# Patient Record
Sex: MALE | Race: WHITE | Employment: OTHER | URBAN - METROPOLITAN AREA
[De-identification: names, ages, dates, MRNs, and addresses within clinical notes are randomized per-mention and may not be internally consistent; named-entity substitution may affect disease eponyms.]

---

## 2021-10-18 ENCOUNTER — HOSPITAL ENCOUNTER (OUTPATIENT)
Age: 76
Setting detail: SPECIMEN
Discharge: HOME | End: 2021-10-18
Payer: MEDICARE

## 2021-10-18 ENCOUNTER — OFFICE VISIT (OUTPATIENT)
Dept: ORTHOPEDIC SURGERY | Age: 76
End: 2021-10-18
Payer: MEDICARE

## 2021-10-18 VITALS — BODY MASS INDEX: 35.36 KG/M2 | HEIGHT: 66 IN | WEIGHT: 220 LBS

## 2021-10-18 DIAGNOSIS — M25.561 RIGHT KNEE PAIN, UNSPECIFIED CHRONICITY: Primary | ICD-10-CM

## 2021-10-18 DIAGNOSIS — M1A.0620 CHRONIC GOUT OF LEFT KNEE, UNSPECIFIED CAUSE: ICD-10-CM

## 2021-10-18 DIAGNOSIS — M17.11 PRIMARY OSTEOARTHRITIS OF RIGHT KNEE: Primary | ICD-10-CM

## 2021-10-18 LAB — URIC ACID: 9.9 MG/DL (ref 3.4–7)

## 2021-10-18 PROCEDURE — G8484 FLU IMMUNIZE NO ADMIN: HCPCS | Performed by: PHYSICIAN ASSISTANT

## 2021-10-18 PROCEDURE — 20611 DRAIN/INJ JOINT/BURSA W/US: CPT | Performed by: PHYSICIAN ASSISTANT

## 2021-10-18 PROCEDURE — G8417 CALC BMI ABV UP PARAM F/U: HCPCS | Performed by: PHYSICIAN ASSISTANT

## 2021-10-18 PROCEDURE — 1123F ACP DISCUSS/DSCN MKR DOCD: CPT | Performed by: PHYSICIAN ASSISTANT

## 2021-10-18 PROCEDURE — 4040F PNEUMOC VAC/ADMIN/RCVD: CPT | Performed by: PHYSICIAN ASSISTANT

## 2021-10-18 PROCEDURE — G8427 DOCREV CUR MEDS BY ELIG CLIN: HCPCS | Performed by: PHYSICIAN ASSISTANT

## 2021-10-18 PROCEDURE — 4004F PT TOBACCO SCREEN RCVD TLK: CPT | Performed by: PHYSICIAN ASSISTANT

## 2021-10-18 PROCEDURE — 3017F COLORECTAL CA SCREEN DOC REV: CPT | Performed by: PHYSICIAN ASSISTANT

## 2021-10-18 PROCEDURE — 99203 OFFICE O/P NEW LOW 30 MIN: CPT | Performed by: PHYSICIAN ASSISTANT

## 2021-10-18 RX ORDER — METHYLPREDNISOLONE ACETATE 80 MG/ML
80 INJECTION, SUSPENSION INTRA-ARTICULAR; INTRALESIONAL; INTRAMUSCULAR; SOFT TISSUE ONCE
Status: COMPLETED | OUTPATIENT
Start: 2021-10-18 | End: 2021-10-18

## 2021-10-18 RX ORDER — COLCHICINE 0.6 MG/1
TABLET ORAL
COMMUNITY

## 2021-10-18 RX ORDER — ASPIRIN 81 MG/1
81 TABLET ORAL DAILY
COMMUNITY

## 2021-10-18 RX ORDER — METOPROLOL TARTRATE 50 MG/1
TABLET, FILM COATED ORAL
COMMUNITY
Start: 2021-10-13

## 2021-10-18 RX ORDER — LISINOPRIL 40 MG/1
TABLET ORAL
COMMUNITY
Start: 2021-09-27

## 2021-10-18 RX ORDER — NITROGLYCERIN 0.4 MG/1
TABLET SUBLINGUAL
COMMUNITY
Start: 2021-09-22

## 2021-10-18 RX ORDER — ISOSORBIDE MONONITRATE 120 MG/1
120 TABLET, EXTENDED RELEASE ORAL DAILY
COMMUNITY
Start: 2021-09-27

## 2021-10-18 RX ORDER — ALLOPURINOL 300 MG/1
300 TABLET ORAL DAILY
COMMUNITY

## 2021-10-18 RX ORDER — PRASUGREL 10 MG/1
TABLET, FILM COATED ORAL
COMMUNITY
Start: 2020-12-10

## 2021-10-18 RX ORDER — PRAVASTATIN SODIUM 40 MG
TABLET ORAL
COMMUNITY
Start: 2021-09-09

## 2021-10-18 RX ORDER — LIDOCAINE HYDROCHLORIDE 10 MG/ML
4 INJECTION, SOLUTION INFILTRATION; PERINEURAL ONCE
Status: COMPLETED | OUTPATIENT
Start: 2021-10-18 | End: 2021-10-18

## 2021-10-18 RX ORDER — GLIPIZIDE 10 MG/1
TABLET ORAL
COMMUNITY
Start: 2021-08-10

## 2021-10-18 RX ORDER — AMLODIPINE BESYLATE 10 MG/1
TABLET ORAL
COMMUNITY
Start: 2021-09-08

## 2021-10-18 RX ORDER — PREGABALIN 75 MG/1
CAPSULE ORAL
COMMUNITY
Start: 2021-10-13

## 2021-10-18 RX ADMIN — LIDOCAINE HYDROCHLORIDE 4 ML: 10 INJECTION, SOLUTION INFILTRATION; PERINEURAL at 13:01

## 2021-10-18 RX ADMIN — METHYLPREDNISOLONE ACETATE 80 MG: 80 INJECTION, SUSPENSION INTRA-ARTICULAR; INTRALESIONAL; INTRAMUSCULAR; SOFT TISSUE at 13:01

## 2021-10-18 ASSESSMENT — ENCOUNTER SYMPTOMS
SHORTNESS OF BREATH: 0
VOMITING: 0
CHEST TIGHTNESS: 0
COLOR CHANGE: 0
NAUSEA: 0
APNEA: 0
CONSTIPATION: 0
RESPIRATORY NEGATIVE: 1
COUGH: 0
DIARRHEA: 0
ABDOMINAL DISTENTION: 0
ABDOMINAL PAIN: 0

## 2021-10-18 NOTE — PROGRESS NOTES
[facility]  [address] [address]  Dept: [phone]  Dept Fax: [phone]          Right Knee -previous Dr. Orlin Brito patient at the [facility]    Subjective:     Chief Complaint   Patient presents with    Knee Pain     Right      HPI:     Mickie Melgar presents today for Right knee pain. The pain has been present for 6 days. The patient recalls no specific injury. He does have a history of gout that he is treated with Colcrys and allopurinol. The patient has tried Tylenol with mild improvement. The pain is now described as Jacquelin Trimble and Dull . There is  pain on weight bearing. The knee has swelled. There is not painful popping and clicking. The knee has not caught or locked up. The knee has not given out. It is  stiff upon arising from sitting. It is  painful to go up and down stairs and sit for a prolonged time. The patient has had a cortisone injection on 1/13/2021 at another facility. The patient has not tried a lubrication injection. The patient has tried physical therapy. The patient has not had surgery. ROS:   Review of Systems   Constitutional: Positive for activity change. Negative for appetite change, fatigue and fever. Respiratory: Negative. Negative for apnea, cough, chest tightness and shortness of breath. Cardiovascular: Negative. Negative for chest pain, palpitations and leg swelling. Gastrointestinal: Negative for abdominal distention, abdominal pain, constipation, diarrhea, nausea and vomiting. Genitourinary: Negative for difficulty urinating, dysuria and hematuria. Musculoskeletal: Positive for arthralgias, gait problem and joint swelling. Negative for myalgias. Skin: Negative for color change and rash. Neurological: Negative for dizziness, weakness, numbness and headaches. Psychiatric/Behavioral: Positive for sleep disturbance.        Past Medical History:    Past Medical History:   Diagnosis Date    Arthritis     Diabetes mellitus ([facility] [address]     Gout     Hyperlipidemia     Hypertension        Past Surgical History:    No past surgical history on file. CurrentMedications:   Current Outpatient Medications   Medication Sig Dispense Refill    amLODIPine (NORVASC) 10 MG tablet       aspirin 81 MG EC tablet Take 81 mg by mouth daily      colchicine (COLCRYS) 0.6 MG tablet Colcrys 0.6 mg tablet      glipiZIDE (GLUCOTROL) 10 MG tablet       isosorbide mononitrate (IMDUR) 120 MG extended release tablet Take 120 mg by mouth daily      lisinopril (PRINIVIL;ZESTRIL) 40 MG tablet Take 2 tablets in the am and 1 in the pm      metoprolol tartrate (LOPRESSOR) 50 MG tablet       metFORMIN (GLUCOPHAGE) 500 MG tablet metformin 500 mg tablet      nitroGLYCERIN (NITROSTAT) 0.4 MG SL tablet nitroglycerin 0.4 mg sublingual tablet      pravastatin (PRAVACHOL) 40 MG tablet       prasugrel (EFFIENT) 10 MG TABS prasugrel 10 mg tablet      pregabalin (LYRICA) 75 MG capsule       Omega-3 Fat Ac-Cholecalciferol (CARDIO OMEGA BENEFITS/VIT D-3 PO) Take by mouth      allopurinol (ZYLOPRIM) 300 MG tablet Take 300 mg by mouth daily      Liraglutide (VICTOZA SC) Inject into the skin       No current facility-administered medications for this visit.        Allergies:    Atorvastatin    Social History:   Social History     Socioeconomic History    Marital status:      Spouse name: None    Number of children: None    Years of education: None    Highest education level: None   Occupational History    None   Tobacco Use    Smoking status: Never Smoker    Smokeless tobacco: Never Used   Vaping Use    Vaping Use: Never used   Substance and Sexual Activity    Alcohol use: Never    Drug use: Never    Sexual activity: None   Other Topics Concern    None   Social History Narrative    None     Social Determinants of Health     Financial Resource Strain:     Difficulty of Paying Living Expenses:    Food Insecurity:     Worried About Running Out of Food in the Last Year:     [address] in the Last Year:    Transportation Needs:     Lack of Transportation (Medical):  Lack of Transportation (Non-Medical):    Physical Activity:     Days of Exercise per Week:     Minutes of Exercise per Session:    Stress:     Feeling of Stress :    Social Connections:     Frequency of Communication with Friends and Family:     Frequency of Social Gatherings with Friends and Family:     Attends [employer] Services:     Active Member of Clubs or Organizations:     Attends Club or Organization Meetings:     Marital Status:    Intimate Partner Violence:     Fear of Current or Ex-Partner:     Emotionally Abused:     Physically Abused:     Sexually Abused:        Family History:  No family history on file. Vitals:   Ht 5' 6\" (1.676 m)   Wt 220 lb (99.8 kg)   BMI 35.51 kg/m²  Body mass index is 35.51 kg/m². Physical Examination:     Orthopedics:    GENERAL: Alert and oriented X3 in no acute distress. SKIN: Intact without lesions or ulcerations. NEURO: Intact to sensory and motor testing. VASC: Capillary refill is less than 3 seconds. KNEE EXAM    LOCATION: Right Knee  GEN: Alert and oriented X 3, in no acute distress. GAIT: The patient's gait was observed while entering the exam room and was noted to be antalgic. The extremity is in varus alignment. SKIN: Intact without rashes, lesions, or ulcerations. No obvious deformity or swelling. NEURO: The patient responds to light touch throughout bilateral LE. Patellar and Achilles reflexes are 2/4. VASC: The bilateral LE is neurovascularly intact with 2/4 DP and 2/4 PT pulses. Brisk capillary refill. ROM: 10/112 degrees. There is mild effusion. MUSC: decreased quad tone  LIGAMENT: Lachman's test is Negative with Good endpoint. Anterior drawer Negative. Posterior drawer Negative.  There is No varus instability at 0 degrees and No varus instability at 30 degrees. There is No valgus instability at 0 degrees and No valgus instability at 30 degrees. SPECIAL: Rojas test is negative with no clunks, + crepitation, and + pain. PALP: There is medial and lateral joint line pain. Assessment:     1. Primary osteoarthritis of right knee    2. Chronic gout of left knee, unspecified cause      Procedures:    Procedure: yes    Regular Knee Injection    Location: Left Knee  Procedure: I discussed in detail the risks, benefits and complications of the corticosteroid injection which included but are not limited to: infection, skin reactions, hot swollen, and anaphylaxis with the patient. Arelis Bel verbalized understanding and they have agreed to have the corticosteroid injection into the left knee. The patient was placed in the Supine position on the exam table. The superior lateral portal was identified and marked with a ball point pen. The skin was prepped with betadine in a sterile fashion. Utilizing a [employer] ultrasound unit with a variable frequency linear transducer and clean technique with sterile gloves, a 3 cc solution containing 2 cc of 1% lidocaine   with 1 cc containing 80 mg of Depo-medrol  was injected. There was no resistance to the injection. The wound was cleansed and a band-aid was placed. the patient tolerated the procedure without difficulty. Adverse reactions to the injection were discussed with the patient including signs of infection (increasing pain, redness, swelling) and the patient was instructed to call immediately if experiencing any of these symptoms. Images of the injection site were recorded throughout the procedure and are saved on the SD card which is stored in the GE ultrasound unit. All images were downloaded and stored in patient's chart.   Radiology:   XR KNEE RIGHT (MIN 4 VIEWS)    Result Date: 10/18/2021  KNEE X-RAY 4 views of the right knee including AP, bilateral tunnel, and lateral in the upright position, and skyline views reveal slight varus alignment with no fracture or dislocation. Kellgren grade II/III changes of osteoarthritis (joint space narrowing, osteophyte, subchondral sclerosis, bony deformity/cyst) of the tricompartment(s). No osseous loose bodies. No bony erosion or periosteal reaction. No soft tissue masses. Calcification of arteries noted. Chondrocalcinosis also noted. Impression: Moderate arthritic changes of the right knee. Plan:   Treatment : I reviewed the x-ray with the patient and I informed them that the left knee osteoarthritis versus gout. We discussed the etiologies and natural histories of primary osteoarthritis of the right knee with the possibility of gout. We discussed the various treatment alternatives including anti-inflammatory medications, physical therapy, injections, further imaging studies and as a last result surgery. During today's visit, we discussed that there is a possibility that this could be a gout flareup the injection will help that also. It may also just be irritation of his osteoarthritis. The patient has opted for a cortisone injection into the left knee to help reduce inflammation and pain. The injection site should never get red, hot, or swollen and if it does the patient will contact our office right away. The patient may experience a increase in soreness the first 24-48 hours due to a cortisone flair and can take anti-inflammatories for a short period of time to reduce that soreness. The patient should not submerge the injection site in water for a minimum of 24 hours to avoid infection. This means no lakes, pools, ponds, or hot tubs for 24 hours. If the patient is diabetic the injection may increase their blood sugar for up to one week. The patient can do this cortisone injection once every 3 months as needed. If the injections stop working and do not give the patient relief the patient should consider surgical interventions to produce long term relief.  I would last like to run a uric acid level to see if this may be his gout that is causing an issue. If it comes back positive I will have him follow-up with his PCP regarding gout treatment. The cortisone injection will also help if it is gout. The patient will call the office immediately with any problems. Orders Placed This Encounter   Medications    lidocaine 1 % injection 4 mL    methylPREDNISolone acetate (DEPO-MEDROL) injection 80 mg       Orders Placed This Encounter   Procedures    Uric Acid     Standing Status:   Future     Number of Occurrences:   1     Standing Expiration Date:   10/18/2022    KS ARTHROCENTESIS ASPIR&/INJ MAJOR JT/BURSA W/US         This note is created with the assistance of a speech recognition program.  While intending to generate a document that actually reflects the content of the visit, the document can still have some errors including those of syntax and sound a like substitutions which may escape proof reading.   In such instances, actual meaning can be extrapolated by contextual diversion    Electronically signed by Juan Taveras PA-C, on 10/18/2021 at 4:55 PM

## 2022-01-10 ENCOUNTER — OFFICE VISIT (OUTPATIENT)
Dept: ORTHOPEDIC SURGERY | Age: 77
End: 2022-01-10
Payer: MEDICARE

## 2022-01-10 VITALS
WEIGHT: 218 LBS | RESPIRATION RATE: 15 BRPM | HEART RATE: 82 BPM | HEIGHT: 66 IN | SYSTOLIC BLOOD PRESSURE: 199 MMHG | DIASTOLIC BLOOD PRESSURE: 93 MMHG | BODY MASS INDEX: 35.03 KG/M2

## 2022-01-10 DIAGNOSIS — M1A.0620 CHRONIC GOUT OF LEFT KNEE, UNSPECIFIED CAUSE: ICD-10-CM

## 2022-01-10 DIAGNOSIS — M17.11 PRIMARY OSTEOARTHRITIS OF RIGHT KNEE: Primary | ICD-10-CM

## 2022-01-10 PROCEDURE — G8427 DOCREV CUR MEDS BY ELIG CLIN: HCPCS | Performed by: PHYSICIAN ASSISTANT

## 2022-01-10 PROCEDURE — G8417 CALC BMI ABV UP PARAM F/U: HCPCS | Performed by: PHYSICIAN ASSISTANT

## 2022-01-10 PROCEDURE — 3017F COLORECTAL CA SCREEN DOC REV: CPT | Performed by: PHYSICIAN ASSISTANT

## 2022-01-10 PROCEDURE — 1123F ACP DISCUSS/DSCN MKR DOCD: CPT | Performed by: PHYSICIAN ASSISTANT

## 2022-01-10 PROCEDURE — G8484 FLU IMMUNIZE NO ADMIN: HCPCS | Performed by: PHYSICIAN ASSISTANT

## 2022-01-10 PROCEDURE — 20611 DRAIN/INJ JOINT/BURSA W/US: CPT | Performed by: PHYSICIAN ASSISTANT

## 2022-01-10 PROCEDURE — 4040F PNEUMOC VAC/ADMIN/RCVD: CPT | Performed by: PHYSICIAN ASSISTANT

## 2022-01-10 PROCEDURE — 1036F TOBACCO NON-USER: CPT | Performed by: PHYSICIAN ASSISTANT

## 2022-01-10 RX ORDER — COLCHICINE 0.6 MG/1
TABLET ORAL
Qty: 30 TABLET | Refills: 0 | Status: SHIPPED | OUTPATIENT
Start: 2022-01-10

## 2022-01-10 RX ORDER — METHYLPREDNISOLONE ACETATE 40 MG/ML
80 INJECTION, SUSPENSION INTRA-ARTICULAR; INTRALESIONAL; INTRAMUSCULAR; SOFT TISSUE ONCE
Status: COMPLETED | OUTPATIENT
Start: 2022-01-10 | End: 2022-01-10

## 2022-01-10 RX ORDER — LIDOCAINE HYDROCHLORIDE 10 MG/ML
2 INJECTION, SOLUTION INFILTRATION; PERINEURAL ONCE
Status: COMPLETED | OUTPATIENT
Start: 2022-01-10 | End: 2022-01-10

## 2022-01-10 RX ADMIN — METHYLPREDNISOLONE ACETATE 80 MG: 40 INJECTION, SUSPENSION INTRA-ARTICULAR; INTRALESIONAL; INTRAMUSCULAR; SOFT TISSUE at 14:28

## 2022-01-10 RX ADMIN — LIDOCAINE HYDROCHLORIDE 2 ML: 10 INJECTION, SOLUTION INFILTRATION; PERINEURAL at 14:27

## 2022-01-10 ASSESSMENT — ENCOUNTER SYMPTOMS
APNEA: 0
CONSTIPATION: 0
SHORTNESS OF BREATH: 0
RESPIRATORY NEGATIVE: 1
ABDOMINAL PAIN: 0
ABDOMINAL DISTENTION: 0
COUGH: 0
VOMITING: 0
NAUSEA: 0
COLOR CHANGE: 0
CHEST TIGHTNESS: 0
DIARRHEA: 0

## 2022-01-10 NOTE — PATIENT INSTRUCTIONS
CORTISONE INJECTION CARE    The injection site should never get red, hot, or swollen and if it does the patient will contact our office right away. The patient may experience a increase in soreness the first 24-48 hours due to a cortisone flair and can take anti-inflammatories for a short period of time to reduce that soreness. The patient should not submerge the injection site in water for a minimum of 24 hours to avoid infection. This means no lakes, pools, ponds, or hot tubs for 24 hours. If the patient is diabetic the injection may increase their blood sugar for up to one week. The patient can do this cortisone injection once every 3 months as needed. Patient Education        Purine-Restricted Diet: Care Instructions  Your Care Instructions     Purines are substances that are found in some foods. Your body turns purines into uric acid. High levels of uric acid can cause gout, which is a form of arthritis that causes pain and inflammation in joints. You may be able to help control the amount of uric acid in your body by limiting high-purine foods in your diet. Follow-up care is a key part of your treatment and safety. Be sure to make and go to all appointments, and call your doctor if you are having problems. It's also a good idea to know your test results and keep a list of the medicines you take. How can you care for yourself at home? · Plan your meals and snacks around foods that are low in purines and are safe for you to eat. These foods include:  ? Green vegetables and tomatoes. ? Fruits. ? Whole-grain breads, rice, and cereals. ? Eggs, peanut butter, and nuts. ? Low-fat milk, cheese, and other milk products. ? Popcorn. ? Gelatin desserts, chocolate, cocoa, and cakes and sweets, in small amounts. · You can eat certain foods that are medium-high in purines, but eat them only once in a while. These foods include:  ? Legumes, such as dried beans and dried peas.  You can have 1 cup cooked legumes each day. ? Asparagus, cauliflower, spinach, mushrooms, and green peas. ? Fish and seafood (other than very high-purine seafood). ? Oatmeal, wheat bran, and wheat germ. · Limit very high-purine foods, including:  ? Organ meats, such as liver, kidneys, sweetbreads, and brains. ? Meats, including thrasher, beef, pork, and lamb. ? Game meats and any other meats in large amounts. ? Anchovies, sardines, herring, mackerel, and scallops. ? Gravy. ? Beer. Where can you learn more? Go to https://[employer]. org and sign in to your [employer] account. Enter F448 in the [employer] box to learn more about \"Purine-Restricted Diet: Care Instructions. \"     If you do not have an account, please click on the \"Sign Up Now\" link. Current as of: September 8, 2021               Content Version: 13.1  © 2006-2021 Healthwise, [facility]. Care instructions adapted under license by [facility] ([facility]). If you have questions about a medical condition or this instruction, always ask your healthcare professional. [place] [address] any warranty or liability for your use of this information.

## 2022-01-10 NOTE — PROGRESS NOTES
[address] AND SP[facility]TS MEDICINE  [address] [place] [address]  Dept: [phone]  Dept Fax: [phone]          Right Knee - Follow Up     Subjective:     Chief Complaint   Patient presents with    Follow-up     R Knee (RICHARD 10/18/21)     HPI:     Kimberly Plata presents today for Right knee pain. The pain has been present for 3 days. His wife came with his appointment today. She states that he has not been on any of his gout medication for about 3 months. He was on allopurinol and Colcrys. Their family doctor retired and they have been unable to find a new doctor. Most of his medications come from his cardiologist and his endocrinologist but he has not had a family doctor to refill gout medications and his significant period of time. She states that they have an appointment with a doctor in April. When she made the appointment she said he had right knee swelling and lower leg swelling. He had a cortisone injection on 10/18/2021 with good relief until a foot few days ago. She states he has been sleeping with his legs down in the lazy boy for the last few days because of the knee pain. ROS:   Review of Systems   Constitutional: Positive for activity change. Negative for appetite change, fatigue and fever. Respiratory: Negative. Negative for apnea, cough, chest tightness and shortness of breath. Cardiovascular: Negative. Negative for chest pain, palpitations and leg swelling. Gastrointestinal: Negative for abdominal distention, abdominal pain, constipation, diarrhea, nausea and vomiting. Genitourinary: Negative for difficulty urinating, dysuria and hematuria. Musculoskeletal: Positive for arthralgias and gait problem. Negative for joint swelling and myalgias. Skin: Negative for color change and rash. Neurological: Negative for dizziness, weakness, numbness and headaches. observed while entering the exam room and was noted to be antalgic. The extremity is in varus alignment. SKIN: Intact without rashes, lesions, or ulcerations. No obvious deformity or swelling. NEURO: The patient responds to light touch throughout bilateral LE. Patellar and Achilles reflexes are 2/4. VASC: The bilateral LE is neurovascularly intact with 2/4 DP and 2/4 PT pulses. Brisk capillary refill. ROM: 20/100 degrees. There is mild effusion. MUSC: decreased quad tone  LIGAMENT:  There is No varus instability at 0 degrees and No varus instability at 30 degrees. There is No valgus instability at 0 degrees and No valgus instability at 30 degrees. PALP: There is medial and lateral joint line pain. Assessment:     1. Primary osteoarthritis of right knee    2. Chronic gout of left knee, unspecified cause      Procedures:    Procedure: yes    Regular Knee Injection    Location: Right Knee  Procedure: I discussed in detail the risks, benefits and complications of the corticosteroid injection which included but are not limited to: infection, skin reactions, hot swollen, and anaphylaxis with the patient. Littie Gunn verbalized understanding and they have agreed to have the corticosteroid injection into the right knee. The patient was placed in the Supine position on the exam table. The superior lateral portal was identified and marked with a ball point pen. The skin was prepped with betadine in a sterile fashion. Utilizing a [employer] ultrasound unit with a variable frequency linear transducer and clean technique with sterile gloves, a 4 cc solution containing 2 cc of 1% lidocaine   with 2 cc containing 40 mg of Depo-medrol  was injected. There was no resistance to the injection. The wound was cleansed and a band-aid was placed. the patient tolerated the procedure without difficulty.  Adverse reactions to the injection were discussed with the patient including signs of infection (increasing pain, redness, swelling) and the patient was instructed to call immediately if experiencing any of these symptoms. Images of the injection site were recorded throughout the procedure and are saved on the SD card which is stored in the GE ultrasound unit. All images were downloaded and stored in patient's chart. Radiology:   No results found. Plan:   Treatment : I reviewed the x-ray. We discussed the etiologies and natural histories of gouty arthritis of the right knee and chronic gout. We discussed the various treatment alternatives including anti-inflammatory medications, physical therapy, injections, further imaging studies and as a last result surgery. During today's visit, we had a long discussion that I am concerned with the swelling is having in his lower extremities. I would describe his swelling is a 2+ edema. This could be from sleeping in the chair for the last few days with his legs down. I would advise wearing compression socks which his wife says they have at home. He should also spend time with his legs elevated over the next several days to see if he gets swelling to go down. If they cannot get in with a family doctor they should at least see his cardiologist if the swelling in his legs do not get better. I think it is very important that they find a family doctor sooner than 3 months from now. We gave her a recommendation of Seth Yo PA-C, who is in the [place] area. They are really thinking they want to have a physician that goes to [facility].  I told him that a lot of docs do not tend to travel to the hospital any longer. They were willing to take our recommendation to get him set up with a new PCP. They can then refill prescriptions for him. I will give him a prescription of the Colcrys since I do believe that this is another gout attack. The patient has opted for a cortisone injection into the right knee to help reduce inflammation and pain.  The injection site should never get red, hot, or swollen and if it does the patient will contact our office right away. The patient may experience a increase in soreness the first 24-48 hours due to a cortisone flair and can take anti-inflammatories for a short period of time to reduce that soreness. The patient should not submerge the injection site in water for a minimum of 24 hours to avoid infection. This means no lakes, pools, ponds, or hot tubs for 24 hours. If the patient is diabetic the injection may increase their blood sugar for up to one week. The patient can do this cortisone injection once every 3 months as needed. If the injections stop working and do not give the patient relief the patient should consider surgical interventions to produce long term relief. I discussed going to physical therapy with the patient but he states that he is an active hola and is not going to go to physical therapy. I did not include information about a diet for people with gout. Patient should return to the clinic in 3 months to follow up with  Lawyer Helen DAVIS. The patient will call the office immediately with any problems. Orders Placed This Encounter   Medications    methylPREDNISolone acetate (DEPO-MEDROL) injection 80 mg    lidocaine 1 % injection 2 mL    colchicine (COLCRYS) 0.6 MG tablet     Sig: Take 2 tabs then one hour later take 1 tablet. Then one every 8 hours. Dispense:  30 tablet     Refill:  0       No orders of the defined types were placed in this encounter. This note is created with the assistance of a speech recognition program.  While intending to generate a document that actually reflects the content of the visit, the document can still have some errors including those of syntax and sound a like substitutions which may escape proof reading.   In such instances, actual meaning can be extrapolated by contextual diversion    Electronically signed by Bakari Harrison PA-C, on 1/10/2022 at 3:24 PM

## 2024-04-29 DIAGNOSIS — M25.522 LEFT ELBOW PAIN: Primary | ICD-10-CM

## 2024-04-29 NOTE — PROGRESS NOTES
[facility] ORTHOPEDICS AND SPORTS MEDICINE  [address]  [facility] [address]  Dept: [phone]  Dept Fax: [phone]        Left elbow-established patient new problem      Subjective:     Chief Complaint   Patient presents with    Elbow Pain     Left Elbow Pain-Ep/NP     HPI:     Tyshawn Whaley is a 78 y.o. year old right hand dominant male that has had pain in the left elbow for 50 years but the pain has gotten worse about a month ago. Occupation: retired GM.  As far as any trauma to the elbow, the patient indicates he fell off a roof in the 1970. He broke his elbow when he fell. The pain is is the worse at night and when doing activities that involve lifting with the elbow. The pain restricts activities such as lifing and straightening his elbow. The pain does not seem to improve with time. The following medications have been tried tylenol, rest.  The patient has not had a corticosteroid injection into the elbow. The patient has not tried physical therapy. The patient has not has surgery. The opposite elbow is okay.  Patient does have a history of gout.    ROS:   Review of Systems   Constitutional:  Positive for activity change. Negative for appetite change, fatigue and fever.   Respiratory: Negative.  Negative for apnea, cough, chest tightness and shortness of breath.    Cardiovascular: Negative.  Negative for chest pain, palpitations and leg swelling.   Gastrointestinal: Negative.  Negative for abdominal distention, abdominal pain, constipation, diarrhea, nausea and vomiting.   Genitourinary: Negative.  Negative for difficulty urinating, dysuria and hematuria.   Musculoskeletal:  Positive for arthralgias. Negative for gait problem, joint swelling and myalgias.   Skin: Negative.  Negative for color change and rash.   Neurological: Negative.  Negative for dizziness, weakness, numbness and headaches.   Psychiatric/Behavioral:

## 2024-04-30 ENCOUNTER — OFFICE VISIT (OUTPATIENT)
Dept: ORTHOPEDIC SURGERY | Age: 79
End: 2024-04-30

## 2024-04-30 VITALS — RESPIRATION RATE: 16 BRPM | HEIGHT: 66 IN | BODY MASS INDEX: 28.51 KG/M2 | WEIGHT: 177.4 LBS | OXYGEN SATURATION: 98 %

## 2024-04-30 DIAGNOSIS — M19.022 ARTHRITIS OF LEFT ELBOW: ICD-10-CM

## 2024-04-30 DIAGNOSIS — M77.12 LATERAL EPICONDYLITIS, LEFT ELBOW: Primary | ICD-10-CM

## 2024-04-30 RX ORDER — CALCIUM CITRATE/VITAMIN D3 200MG-6.25
TABLET ORAL
COMMUNITY
Start: 2024-04-22

## 2024-04-30 RX ORDER — ACETAMINOPHEN 500 MG
500 TABLET ORAL EVERY 6 HOURS PRN
COMMUNITY

## 2024-04-30 RX ORDER — APIXABAN 5 MG/1
TABLET, FILM COATED ORAL
COMMUNITY
Start: 2022-11-21

## 2024-04-30 RX ORDER — FUROSEMIDE 40 MG/1
40 TABLET ORAL DAILY
COMMUNITY
Start: 2024-04-11

## 2024-04-30 RX ORDER — CARVEDILOL 12.5 MG/1
TABLET ORAL
COMMUNITY
Start: 2024-03-01

## 2024-04-30 RX ORDER — BUPIVACAINE HYDROCHLORIDE 2.5 MG/ML
0.5 INJECTION, SOLUTION INFILTRATION; PERINEURAL ONCE
Status: COMPLETED | OUTPATIENT
Start: 2024-04-30 | End: 2024-04-30

## 2024-04-30 RX ORDER — BETAMETHASONE SODIUM PHOSPHATE AND BETAMETHASONE ACETATE 3; 3 MG/ML; MG/ML
6 INJECTION, SUSPENSION INTRA-ARTICULAR; INTRALESIONAL; INTRAMUSCULAR; SOFT TISSUE ONCE
Status: COMPLETED | OUTPATIENT
Start: 2024-04-30 | End: 2024-04-30

## 2024-04-30 RX ORDER — INSULIN GLARGINE 100 [IU]/ML
10 INJECTION, SOLUTION SUBCUTANEOUS 2 TIMES DAILY
COMMUNITY
Start: 2022-08-10

## 2024-04-30 RX ORDER — RIVASTIGMINE 9.5 MG/24H
1 PATCH, EXTENDED RELEASE TRANSDERMAL DAILY
COMMUNITY
Start: 2023-12-07 | End: 2024-12-06

## 2024-04-30 RX ORDER — LIDOCAINE HYDROCHLORIDE 10 MG/ML
0.5 INJECTION, SOLUTION INFILTRATION; PERINEURAL ONCE
Status: COMPLETED | OUTPATIENT
Start: 2024-04-30 | End: 2024-04-30

## 2024-04-30 RX ADMIN — LIDOCAINE HYDROCHLORIDE 0.5 ML: 10 INJECTION, SOLUTION INFILTRATION; PERINEURAL at 13:41

## 2024-04-30 RX ADMIN — BUPIVACAINE HYDROCHLORIDE 1.25 MG: 2.5 INJECTION, SOLUTION INFILTRATION; PERINEURAL at 13:41

## 2024-04-30 RX ADMIN — BETAMETHASONE SODIUM PHOSPHATE AND BETAMETHASONE ACETATE 6 MG: 3; 3 INJECTION, SUSPENSION INTRA-ARTICULAR; INTRALESIONAL; INTRAMUSCULAR; SOFT TISSUE at 13:41

## 2024-04-30 ASSESSMENT — ENCOUNTER SYMPTOMS
VOMITING: 0
COLOR CHANGE: 0
APNEA: 0
RESPIRATORY NEGATIVE: 1
ABDOMINAL DISTENTION: 0
ABDOMINAL PAIN: 0
SHORTNESS OF BREATH: 0
CHEST TIGHTNESS: 0
NAUSEA: 0
COUGH: 0
DIARRHEA: 0
GASTROINTESTINAL NEGATIVE: 1
CONSTIPATION: 0

## 2024-12-12 ENCOUNTER — OFFICE VISIT (OUTPATIENT)
Dept: ORTHOPEDIC SURGERY | Age: 79
End: 2024-12-12
Payer: MEDICARE

## 2024-12-12 VITALS — HEIGHT: 66 IN | RESPIRATION RATE: 18 BRPM | BODY MASS INDEX: 27.97 KG/M2 | WEIGHT: 174 LBS

## 2024-12-12 DIAGNOSIS — M25.522 LEFT ELBOW PAIN: ICD-10-CM

## 2024-12-12 DIAGNOSIS — M25.532 ACUTE PAIN OF LEFT WRIST: Primary | ICD-10-CM

## 2024-12-12 PROCEDURE — 1123F ACP DISCUSS/DSCN MKR DOCD: CPT

## 2024-12-12 PROCEDURE — 1036F TOBACCO NON-USER: CPT

## 2024-12-12 PROCEDURE — G8484 FLU IMMUNIZE NO ADMIN: HCPCS

## 2024-12-12 PROCEDURE — 1159F MED LIST DOCD IN RCRD: CPT

## 2024-12-12 PROCEDURE — 99213 OFFICE O/P EST LOW 20 MIN: CPT

## 2024-12-12 PROCEDURE — G8427 DOCREV CUR MEDS BY ELIG CLIN: HCPCS

## 2024-12-12 PROCEDURE — 1125F AMNT PAIN NOTED PAIN PRSNT: CPT

## 2024-12-12 PROCEDURE — G8419 CALC BMI OUT NRM PARAM NOF/U: HCPCS

## 2024-12-12 RX ORDER — METHYLPREDNISOLONE 4 MG/1
TABLET ORAL
Qty: 1 KIT | Refills: 0 | Status: SHIPPED | OUTPATIENT
Start: 2024-12-12 | End: 2024-12-18

## 2024-12-12 ASSESSMENT — ENCOUNTER SYMPTOMS: COLOR CHANGE: 1

## 2024-12-12 NOTE — PATIENT INSTRUCTIONS
-Keep the hand elevated above the level of the heart as much as you can when resting    -Keep the compression sleeve on during the day and take it off at night before bed.    -Apply ice to the elbow and wrist for 20 minutes every 2 hours to help with pain and swelling.    -Take the oral steroids exactly as prescribed    -Follow up in 1 week for a recheck.

## 2024-12-12 NOTE — PROGRESS NOTES
[facility] ORTHOPEDICS AND SPORTS MEDICINE  [address]  [facility] [place] [address]  Dept: [phone]  Dept Fax: [phone]        Ambulatory Follow Up      Subjective:   Tyshawn Whaley is a 79 y.o. year old male who presents to our office today for routine followup regarding his   1. Acute pain of left wrist    2. Left elbow pain    .    Chief Complaint   Patient presents with    Elbow Pain     L Elbow/Wrist Pain       Date of Injury: no known injury.    History of Present Illness  The patient is a right hand dominant 79-year-old male who presents for evaluation of elbow and hand pain. He is accompanied today by his wife.     He reports experiencing swelling in his left hand and elbow, which he first noticed upon waking on Monday morning. He has not had any recent injuries. His last consultation was with Rona Slater PA-C in April, during which an injection was administered for left elbow lateral epicondylitis. He also reports redness on the dorsal aspect of his hand. The severity of the swelling has rendered him unable to dress himself. He reports no systemic symptoms such as fever or chills. His temperature was recorded as 97 degrees two days prior. He is unable to make a fist with his hand and experiences difficulty in extending his fingers. He reports no numbness or tingling sensations. He was administered Tylenol on Monday, which resulted in dizziness and shortness of breath. He has not attempted any non-pharmacological interventions such as icing, compression, or elevation of the arm. He has no history of cellulitis.     He has a past medical history of gout, which typically manifests in his knee. He is not currently on any gout medication. He recalls being on gout medication years ago, which he used to take every summer, but he does not remember the name of the medication. He has a past medical history of a fall from a roof in the

## 2024-12-20 ENCOUNTER — OFFICE VISIT (OUTPATIENT)
Dept: ORTHOPEDIC SURGERY | Age: 79
End: 2024-12-20
Payer: MEDICARE

## 2024-12-20 VITALS — WEIGHT: 170 LBS | BODY MASS INDEX: 27.32 KG/M2 | HEIGHT: 66 IN | RESPIRATION RATE: 18 BRPM

## 2024-12-20 DIAGNOSIS — M25.522 LEFT ELBOW PAIN: Primary | ICD-10-CM

## 2024-12-20 DIAGNOSIS — M25.532 ACUTE PAIN OF LEFT WRIST: ICD-10-CM

## 2024-12-20 PROCEDURE — 99213 OFFICE O/P EST LOW 20 MIN: CPT

## 2024-12-20 PROCEDURE — 1123F ACP DISCUSS/DSCN MKR DOCD: CPT

## 2024-12-20 PROCEDURE — 1159F MED LIST DOCD IN RCRD: CPT

## 2024-12-20 PROCEDURE — G8484 FLU IMMUNIZE NO ADMIN: HCPCS

## 2024-12-20 PROCEDURE — 1036F TOBACCO NON-USER: CPT

## 2024-12-20 PROCEDURE — 1125F AMNT PAIN NOTED PAIN PRSNT: CPT

## 2024-12-20 PROCEDURE — G8427 DOCREV CUR MEDS BY ELIG CLIN: HCPCS

## 2024-12-20 PROCEDURE — G8419 CALC BMI OUT NRM PARAM NOF/U: HCPCS

## 2024-12-20 NOTE — PROGRESS NOTES
[facility] ORTHOPEDICS AND SPORTS MEDICINE  [address]  [place] [address]  Dept: [phone]  Dept Fax: [phone]        Ambulatory Follow Up      Subjective:   Tyshawn Whaley is a 79 y.o. year old male who presents to our office today for routine followup regarding his   1. Left elbow pain    2. Acute pain of left wrist    .    Chief Complaint   Patient presents with    Wrist Pain     Left Wrist/Elbow Pain       History of Present Illness  The patient is a 79-year-old male who presents for a 1 week follow up of his left elbow and wrist swelling. He is accompanied by his wife.    He reports significant improvement in his left elbow and wrist swelling following the initiation of steroid therapy. He has been utilizing a compression sleeve, ice, and elevation as part of his treatment regimen. He has completed the medrol dose pack. He is right-handed and has experienced an increase in his left arm's functionality since starting the medication. He is unable to make a tight fist, a function he was previously capable of, and experiences mild tenderness in his hand. He is unable to bend his middle finger, which causes soreness when attempted. He expresses dissatisfaction with his current hand function, particularly the inability to close his middle finger, a problem persisting for the past week. He reports no recent injury to his hand.    He has a history of gout and arthritis. The steroids have caused an increase in his blood sugar levels, reaching the 300s.       Review of Systems   Constitutional:  Negative for chills and fever.   Musculoskeletal:  Positive for arthralgias. Negative for joint swelling.   Skin:  Negative for color change and rash.   Neurological:  Negative for weakness and numbness.         Objective :   Resp 18   Ht 1.676 m (5' 6\")   Wt 77.1 kg (170 lb)   BMI 27.44 kg/m²  Body mass index is 27.44 kg/m².  General:

## 2024-12-23 ASSESSMENT — ENCOUNTER SYMPTOMS: COLOR CHANGE: 0
